# Patient Record
Sex: MALE | Race: OTHER | URBAN - METROPOLITAN AREA
[De-identification: names, ages, dates, MRNs, and addresses within clinical notes are randomized per-mention and may not be internally consistent; named-entity substitution may affect disease eponyms.]

---

## 2021-06-22 ENCOUNTER — EMERGENCY (EMERGENCY)
Facility: HOSPITAL | Age: 31
LOS: 1 days | Discharge: HOME | End: 2021-06-22
Admitting: EMERGENCY MEDICINE
Payer: SELF-PAY

## 2021-06-22 VITALS
SYSTOLIC BLOOD PRESSURE: 109 MMHG | TEMPERATURE: 98 F | HEIGHT: 69 IN | RESPIRATION RATE: 18 BRPM | OXYGEN SATURATION: 98 % | HEART RATE: 70 BPM | WEIGHT: 145.06 LBS | DIASTOLIC BLOOD PRESSURE: 70 MMHG

## 2021-06-22 DIAGNOSIS — H53.8 OTHER VISUAL DISTURBANCES: ICD-10-CM

## 2021-06-22 DIAGNOSIS — F17.200 NICOTINE DEPENDENCE, UNSPECIFIED, UNCOMPLICATED: ICD-10-CM

## 2021-06-22 PROCEDURE — 99283 EMERGENCY DEPT VISIT LOW MDM: CPT

## 2021-06-22 NOTE — ED PROVIDER NOTE - OBJECTIVE STATEMENT
30 yo m pw 9 month of eye vision changes reports blurry vision with no loss of visual acuity began 9 months ago with no change in severity and not associated with eye pain, headaches, or any other focal deficits. Pt reports it began as "an eye drop" or "a piece of fruit" in the eye. Denies falls or head trauma.     I have reviewed available current nursing and previous documentation of past medical, surgical, family, and/or social history.

## 2021-06-22 NOTE — ED ADULT TRIAGE NOTE - CHIEF COMPLAINT QUOTE
PT BIBEMS complaining of blurry vision for the past 9 months. Pt denies alcohol and drug use. As per EMS pt walked into random apartment building and asked doorman to call 911.

## 2021-06-22 NOTE — ED PROVIDER NOTE - NSFOLLOWUPCLINICS_GEN_ALL_ED_FT
[facility] - Ophthalmology Clinic  Ophthalmology  [address] [place] [address], [address]  [place], [place] [address]  Phone: [phone]  Fax:   Follow Up Time: 1-3 Days

## 2021-06-22 NOTE — ED PROVIDER NOTE - NS ED ROS FT
Review of Systems    Constitutional: (-) fever  Eyes/ENT: (-) sore throat, (-) ear pain  Cardiovascular: (-) chest pain, (-) palpitation   Respiratory: (-) cough, (-) shortness of breath  Gastrointestinal: (-) abdominal pain (-) vomiting, (-) diarrhea  Musculoskeletal: (-) neck pain, (-) back pain, (-) joint pain  Integumentary: (-) rash, (-) edema  Neurological: (-) headache, (-) altered mental status  Heme/Lymph: (-) easy bruising (-) easy bleeding  Allergic/Immunologic: (-) pruritus

## 2021-06-22 NOTE — ED PROVIDER NOTE - PHYSICAL EXAMINATION
Physical Exam    Vital Signs: I have reviewed the initial vital signs.  Constitutional: well-nourished, appears stated age, no acute distress  Eyes: PERRLA, and symmetrical lids; OS, OU, OD 20/20. No visual field loss  ENT: Neck supple with no adenopathy, moist MM.  Cardiovascular: regular rate, regular rhythm, well-perfused extremities  Respiratory: unlabored respiratory effort, clear to auscultation bilaterally  Gastrointestinal: soft, non-tender abdomen, no pulsatile mass  Musculoskeletal: supple neck, no lower extremity edema  Integumentary: warm, dry, no rash  Neurologic: awake, alert, cranial nerves II-XII grossly intact, extremities’ motor and sensory functions grossly intact, no ataxia, no dysmentria, steady gait  Psychiatric: A&Ox3

## 2021-06-22 NOTE — ED PROVIDER NOTE - NSFOLLOWUPINSTRUCTIONS_ED_ALL_ED_FT
Visual Floaters    WHAT YOU NEED TO KNOW:    What are visual floaters? Floaters are specks that appear to move around in your field of vision. They are dark and shaped like spots or cobwebs. If you try to look straight at them, they seem to dart away.    What causes visual floaters? Floaters may be caused by tiny particles of debris or blood that float in the vitreous. The vitreous is the gel that fills most of your eye. As you age, the vitreous shrinks and pulls away from the retina. The retina is the thin layer that lines the back of your eye. When this happens, the tissue becomes stringy and casts a shadow on the retina. These shadows can cause you to see floaters. Floaters may also be caused by problems with the retina.     What increases my risk of visual floaters?     Aging       Nearsightedness (trouble seeing things far away)       Inflammation inside the eye, sometimes caused by infection       Medical conditions, such as diabetes or high blood pressure       Eye injuries or cataract surgery       Problems with the retina    How are visual floaters diagnosed? Your healthcare provider will examine your eyes and check your vision. He will ask you about your overall health, any eye conditions or injuries, and if you are nearsighted. A slit-lamp exam may be done. This exam uses a microscope with a strong light to check inside your eye.     How are visual floaters treated? Most floaters are not harmful and do not require treatment. Surgery may be needed if the floaters are so bad that you cannot see well.    How can I protect my eyes?     Get annual eye exams. Your ophthalmologist or optometrist will examine your eyes and test your vision.    Wear a hat and sunglasses with ultraviolet (UV) protection lenses to protect your eyes when you are outdoors.    Manage your health conditions. See your healthcare provider regularly if you have a health condition that may affect your vision, such as diabetes or high blood pressure.    When should I contact my healthcare provider?   You start to see more floaters over time.  You have eye pain or blurred vision, or light hurts your eyes.  You have questions or concerns about your care.  When should I seek immediate care or call 911?     You suddenly see more floaters, or flashing lights.  You have a gradual or sudden loss of vision, or a change in your vision.    CARE AGREEMENT:    You have the right to help plan your care. Learn about your health condition and how it may be treated. Discuss treatment options with your healthcare providers to decide what care you want to receive. You always have the right to refuse treatment.

## 2021-06-22 NOTE — ED PROVIDER NOTE - PATIENT PORTAL LINK FT
You can access the FollowMyHealth Patient Portal offered by [facility] by registering at the following website: [email or web address]. By joining [employer]’s FollowMyHealth portal, you will also be able to view your health information using other applications (apps) compatible with our system.

## 2022-04-05 NOTE — ED ADULT NURSE NOTE - CAS DISCH CONDITION
Stable Klisyri Counseling:  I discussed with the patient the risks of Klisyri including but not limited to erythema, scaling, itching, weeping, crusting, and pain.